# Patient Record
Sex: FEMALE | URBAN - METROPOLITAN AREA
[De-identification: names, ages, dates, MRNs, and addresses within clinical notes are randomized per-mention and may not be internally consistent; named-entity substitution may affect disease eponyms.]

---

## 2017-02-20 ENCOUNTER — TRANSFERRED RECORDS (OUTPATIENT)
Dept: HEALTH INFORMATION MANAGEMENT | Facility: CLINIC | Age: 22
End: 2017-02-20

## 2017-02-20 DIAGNOSIS — Z53.9 ERRONEOUS ENCOUNTER--DISREGARD: Primary | ICD-10-CM

## 2017-02-20 LAB
ALBUMIN SERPL-MCNC: 4.2 G/DL
ALP SERPL-CCNC: 83 U/L
ALT SERPL-CCNC: 9 U/L
AST SERPL-CCNC: 14 U/L
BILIRUB SERPL-MCNC: 0.2 MG/DL
BILIRUBIN DIRECT: 0.1 MG/DL
CHOL/HDLC RATIO - QUEST: 4.67
CHOLEST SERPL-MCNC: 154 MG/DL
GLUCOSE SERPL-MCNC: 96 MG/DL (ref 70–99)
HDLC SERPL-MCNC: 33 MG/DL
LDLC SERPL CALC-MCNC: 98 MG/DL
TOTAL PROTEIN - QUEST: 6.9
TRIGL SERPL-MCNC: 114 MG/DL

## 2017-02-22 ENCOUNTER — OFFICE VISIT (OUTPATIENT)
Dept: OTHER | Facility: OUTPATIENT CENTER | Age: 22
End: 2017-02-22

## 2017-02-22 DIAGNOSIS — F64.0 GENDER DYSPHORIA IN ADULT: Primary | ICD-10-CM

## 2017-02-22 NOTE — PROGRESS NOTES
[facility] -  Case Progress Note    Date of Service:2/22/17  Client Name: Soraida Dudley  Preferred name:  Harpreet male pronouns   YOB: 1995  MRN:  [id number]  Treating Provider: Galen Fermin Ph.D. Postdoctoral Fellow [facility]   Type of Session:  Individual   Present in Session: Client only  Number of Minutes:  45 minutes    Current Symptoms/Status:  The client meets the criteria for Gender Dysphoria (GD), which includes cross gender identification and substantial discomfort with biological sex (anatomical dysphoria) and the expectation of the birth assigned gender role for a period greater than 6 months.  There does not appear to be any evidence that the gender identity concerns are motivated by fetishistic or compulsive characteristics.  Progress Toward Treatment Goals:   The client continues to come to sessions.  Client has fallen behind on keeping appointments with Dr. Penn.      Intervention: Modality and Response:    The client (cl) arrived on time.  Cl  was oriented in person, place and time. Cl. denies suicidal or homicidal ideations. Cl. has good coping skills/social supports. Cl. denies difficulties with alcohol or substances. Client reported that since he came last time, he has been dealing with issues around dating and romantic relationships. Provider engaged the client in conversation about the impact of the transition process in relation to his relationship and his sexuality.  Client was open to the conversation, but expressed discomfort in regards to talking about sex.  Client reported that he does talk about sex with some of his closest cisgender friends and is able to talk to them about what his limitation sexually are.  Provider engaged the client on things and products to consider as a means to navigate his sexuality moving forward in a gender affirming manner. The client continues to report stability work environment.  The client reported that  although mother has continued to express wishing he transition back to female presentation, he has a stable and comfortable family environment.     Assignment:  - Call insurance company and inquire about transgender health coverage (specifically about his hormone prescription).   - Schedule with Dr. Penn for follow up session now that he has done his blood work.   Interactive Complexity:  1. None    Diagnosis:  302.85 (F64.1) Gender Dysphoria in Adult     Plan / Need for Future Services:  Return for therapy in 4 weeks.  Client will come to continue addressing gender goals as well as familial conflicts relating to the clients gender identity.     Galen Fermin, Ph.D.   Postdoctoral Fellow       I did not personally see the patient.  I reviewed and agree with the assessment and plan as documented in this note.   Darin Subramanian, PhD LP

## 2017-02-22 NOTE — MR AVS SNAPSHOT
After Visit Summary   2/22/2017    Soraida Dudley    MRN: [id number]           Patient Information     Date Of Birth          1995        Visit Information        Provider Department      2/22/2017 10:00 AM Galen Casey, PhD [facility]        Today's Diagnoses     Gender dysphoria in adult    -  1       Follow-ups after your visit        Your next 10 appointments already scheduled     Feb 27, 2017  8:30 AM CST   OFFICE VISIT with Estevan Penn MD   [facility] ([facility])    [address]  [address]  [facility] [address]   [phone]            Mar 22, 2017 10:00 AM CDT   INDIVIDUAL THERAPY with Galen Casey PhD   [facility] ([facility])    [address]  [address]  [facility] [address]   [phone]            Apr 26, 2017 10:00 AM CDT   INDIVIDUAL THERAPY with Galen Casey PhD   [facility] ([facility])    [address]  [address]  [facility] [address]   [phone]              Who to contact     Please call your clinic at [phone] to:    Ask questions about your health    Make or cancel appointments    Discuss your medicines    Learn about your test results    Speak to your doctor   If you have compliments or concerns about an experience at your clinic, or if you wish to file a complaint, please contact [facility] Patient Relations at [phone] or email us at [email or web address]         Additional Information About Your Visit        MyChart Information     MyCarGossip is an electronic gateway that provides easy, online access to your medical records. With MyCarGossip, you can request a clinic appointment, read your test results, renew a prescription or communicate with your care team.     To sign up for Copinyt visit the website at [email or web address]   You will be asked to enter  the access code listed below, as well as some personal information. Please follow the directions to create your username and password.     Your access code is: [email or web address]  Expires: 2017  4:02 PM     Your access code will  in 90 days. If you need help or a new code, please contact your [facility] or call [phone] for assistance.        Care EveryWhere ID     This is your Care EveryWhere ID. This could be used by other organizations to access your [place] medical records  [id number]         Blood Pressure from Last 3 Encounters:   16 119/72    Weight from Last 3 Encounters:   16 63 kg (139 lb) (70 %)*     * Growth percentiles are based on [facility] 2-20 Years data.              We Performed the Following     Individual Psychotherapy (38-52 min) [24870]        Primary Care Provider    None Specified       No primary provider on file.        Thank you!     Thank you for choosing [facility]  for your care. Our goal is always to provide you with excellent care. Hearing back from our patients is one way we can continue to improve our services. Please take a few minutes to complete the written survey that you may receive in the mail after your visit with us. Thank you!             Your Updated Medication List - Protect others around you: Learn how to safely use, store and throw away your medicines at www.disposemymeds.org.      Notice  As of 2017  4:02 PM    You have not been prescribed any medications.

## 2017-02-27 ENCOUNTER — OFFICE VISIT (OUTPATIENT)
Dept: OTHER | Facility: OUTPATIENT CENTER | Age: 22
End: 2017-02-27

## 2017-02-27 VITALS
DIASTOLIC BLOOD PRESSURE: 73 MMHG | HEIGHT: 60 IN | WEIGHT: 141.4 LBS | SYSTOLIC BLOOD PRESSURE: 109 MMHG | HEART RATE: 59 BPM | BODY MASS INDEX: 27.76 KG/M2

## 2017-02-27 DIAGNOSIS — F64.0 GENDER DYSPHORIA IN ADOLESCENT AND ADULT: Primary | ICD-10-CM

## 2017-02-27 RX ORDER — TESTOSTERONE CYPIONATE 200 MG/ML
50 INJECTION, SOLUTION INTRAMUSCULAR WEEKLY
Qty: 2 ML | Refills: 1 | Status: SHIPPED | OUTPATIENT
Start: 2017-02-27 | End: 2017-09-25

## 2017-02-27 ASSESSMENT — ENCOUNTER SYMPTOMS
POLYDIPSIA: 0
NERVOUS/ANXIOUS: 0
NUMBNESS: 0
HEADACHES: 0
LIGHT-HEADEDNESS: 0
CHILLS: 0
CHEST TIGHTNESS: 0
FREQUENCY: 0
SHORTNESS OF BREATH: 0
UNEXPECTED WEIGHT CHANGE: 0
ABDOMINAL PAIN: 0
VOMITING: 0
DYSPHORIC MOOD: 0
FEVER: 0
WEAKNESS: 0
PALPITATIONS: 0

## 2017-02-27 ASSESSMENT — PAIN SCALES - GENERAL: PAINLEVEL: NO PAIN (0)

## 2017-02-27 NOTE — MR AVS SNAPSHOT
After Visit Summary   2/27/2017    Soraida Dudley    MRN: [id number]           Patient Information     Date Of Birth          1995        Visit Information        Provider Department      2/27/2017 8:30 AM Estevan Penn MD [facility]        Today's Diagnoses     Gender dysphoria in adolescent and adult    -  1      Care Instructions    1. Do lab test for testosterone level 3-4 days after injection in 1 month  2. Can call for appointment with Dr. Penn or go to [facility] if having trouble with injections  3. See Dr. Penn in 2 months    For questions or concerns please call [phone] Monday through Friday   Or send a ALICE App message.    For medication refills contact your pharmacy. Ask that they fax the request to [phone].                    Follow-ups after your visit        Follow-up notes from your care team     Return in about 2 months (around 4/27/2017).      Your next 10 appointments already scheduled     Mar 22, 2017 10:00 AM CDT   INDIVIDUAL THERAPY with Galen Casey, PhD   [facility] ([facility])    [address] [facility] [address]  [address] 75[address]  [facility] [address]   [phone]            Apr 26, 2017 10:00 AM CDT   INDIVIDUAL THERAPY with Galen Casey, PhD   [facility] ([facility])    [address]  [address]  [facility] [address]   [phone]              Who to contact     Please call your clinic at [phone] to:    Ask questions about your health    Make or cancel appointments    Discuss your medicines    Learn about your test results    Speak to your doctor   If you have compliments or concerns about an experience at your clinic, or if you wish to file a complaint, please contact [facility] Patient Relations at [phone] or email us at [email or web address]         Additional Information About Your  "Visit        FlatStack Information     FlatStack is an electronic gateway that provides easy, online access to your medical records. With FlatStack, you can request a clinic appointment, read your test results, renew a prescription or communicate with your care team.     To sign up for FlatStack visit the website at [email or web address]   You will be asked to enter the access code listed below, as well as some personal information. Please follow the directions to create your username and password.     Your access code is: [email or web address]  Expires: 2017  4:02 PM     Your access code will  in 90 days. If you need help or a new code, please contact your [facility] or call [phone] for assistance.        Care EveryWhere ID     This is your Care EveryWhere ID. This could be used by other organizations to access your [place] medical records  [id number]        Your Vitals Were     Pulse Height Last Period BMI (Body Mass Index)          59 1.511 m (4' 11.5\") (LMP Unknown) 28.08 kg/m2         Blood Pressure from Last 3 Encounters:   17 109/73   16 119/72    Weight from Last 3 Encounters:   17 64.1 kg (141 lb 6.4 oz) (72 %)*   16 63 kg (139 lb) (70 %)*     * Growth percentiles are based on [facility] 2-20 Years data.              We Performed the Following     SCANNED MISC. LAB RESULTS          Today's Medication Changes          These changes are accurate as of: 17 11:59 PM.  If you have any questions, ask your nurse or doctor.               Start taking these medicines.        Dose/Directions    Syringe/Needle (Disp) 23G X 1\" 1 ML Misc   Used for:  Gender dysphoria in adolescent and adult   Started by:  Estevan Penn MD        To use with weekly IM injection   Quantity:  50 each   Refills:  1       testosterone cypionate 200 MG/ML injection   Commonly known as:  DEPOTESTOTERONE CYPIONATE   Used for:  Gender dysphoria in adolescent and adult   Started by: " " Estevan Penn MD        Dose:  50 mg   Inject 0.25 mLs (50 mg) into the muscle once a week   Quantity:  2 mL   Refills:  1            Where to get your medicines      These medications were sent to [facility] [address] [place] [address]  [address] [place] [address]     Phone:  [phone]     Syringe/Needle (Disp) 23G X 1\" 1 ML Misc         Some of these will need a paper prescription and others can be bought over the counter.  Ask your nurse if you have questions.     Bring a paper prescription for each of these medications     testosterone cypionate 200 MG/ML injection                Primary Care Provider    None Specified       No primary provider on file.        Thank you!     Thank you for choosing [facility]  for your care. Our goal is always to provide you with excellent care. Hearing back from our patients is one way we can continue to improve our services. Please take a few minutes to complete the written survey that you may receive in the mail after your visit with us. Thank you!             Your Updated Medication List - Protect others around you: Learn how to safely use, store and throw away your medicines at www.disposemymeds.org.          This list is accurate as of: 2/27/17 11:59 PM.  Always use your most recent med list.                   Brand Name Dispense Instructions for use    Syringe/Needle (Disp) 23G X 1\" 1 ML Misc     50 each    To use with weekly IM injection       testosterone cypionate 200 MG/ML injection    DEPOTESTOTERONE CYPIONATE    2 mL    Inject 0.25 mLs (50 mg) into the muscle once a week         "

## 2017-02-27 NOTE — LETTER
June 30, 2017      Soraida Dudley  [address]  [facility] [address]        Dear Soraida,    The results of your recent test(s) listed below show testosterone in the normal male range. Please schedule a follow up appointment, as you are several months overdue for a follow up visit.     Results for orders placed or performed in visit on 02/27/17   TESTOSTERONE, FREE & TOTAL   Result Value Ref Range    Testosterone Free 107.5 ng/dL    Testosterone Total 488 ng/dL         Please note that test explanations are brief and do not reflect all diagnostic uses.  If you have any questions or concerns, please call the clinic at [phone].      Sincerely,    Estevan Penn

## 2017-02-27 NOTE — PROGRESS NOTES
ILENE Mullins  is a 21 year old individual that uses pronouns He/Him/His/Himself that presents today for follow up of:  masculinizing hormone therapy.   Gender identity: male.   Started Hormone  therapy  today  Continues on N/A starts today  Any special concerns today?  no current concerns  On hormones?.Starts today  Gender related body changes since last visit: n/a    Activity: nothing so far; plans to start gym  New health concerns since last visit: none. LMP. 1/ 20//2017--nadeen--due  3 packs in last month--ready to quit    No past surgical history on file.    Patient Active Problem List   Diagnosis     Gender dysphoria in adult       No current outpatient prescriptions on file.       History   Smoking Status     Current Some Day Smoker   Smokeless Tobacco     Not on file        No Known Allergies    Health Maintenance Due   Topic Date Due     CHLAMYDIA SCREENING  1995     PEDS DTAP/TDAP (1 - Tdap) 10/22/2004     HPV IMMUNIZATION (1 of 3 - Female 3 Dose Series) 10/22/2008     TETANUS IMMUNIZATION (SYSTEM ASSIGNED)  10/22/2015     INFLUENZA VACCINE (SYSTEM ASSIGNED)  09/01/2016         Problem, Medication and Allergy Lists were reviewed and are current..         Review of Systems:   Review of Systems   Constitutional: Negative for chills, fever and unexpected weight change.   Eyes: Negative for visual disturbance.   Respiratory: Negative for chest tightness and shortness of breath.    Cardiovascular: Negative for chest pain, palpitations and leg swelling.   Gastrointestinal: Negative for abdominal pain and vomiting.   Endocrine: Negative for polydipsia and polyuria.   Genitourinary: Negative for frequency.   Neurological: Negative for weakness, light-headedness, numbness and headaches.   Psychiatric/Behavioral: Negative for dysphoric mood and suicidal ideas. The patient is not nervous/anxious.             Physical Exam:     Vitals:    02/27/17 0845   BP: 109/73   Pulse: 59   Weight: 64.1 kg (141 lb 6.4  "oz)   Height: 1.511 m (4' 11.5\")     BMI= Body mass index is 28.08 kg/(m^2).   Wt Readings from Last 10 Encounters:   02/27/17 64.1 kg (141 lb 6.4 oz) (72 %)*   12/19/16 63 kg (139 lb) (70 %)*     * Growth percentiles are based on [facility] 2-20 Years data.     Appearance: Male appearance and dress    GENERAL:: healthy, alert and no distress  Voice, skin per flowsheet    Affect: Appropriate/mood-congruent           Labs:   Results from last visit:  Orders Only on 02/20/2017   Component Date Value Ref Range Status     AST 02/20/2017 14  U/L Final     ALT 02/20/2017 9  U/L Final     Alkaline Phosphatase 02/20/2017 83  U/L Final     Glucose 02/20/2017 96  70 - 99 mg/dL Final     Total Protein 02/20/2017 6.9   Final     Albumin 02/20/2017 4.2  g/dL Final     Bilirubin Direct 02/20/2017 0.1  mg/dL Final     Bilirubin Total 02/20/2017 0.2  mg/dL Final     LDL Cholesterol Calculated 02/20/2017 98  mg/dL Final     HDL Cholesterol 02/20/2017 33  mg/dL Final     Cholesterol 02/20/2017 154  mg/dL Final     Triglycerides 02/20/2017 114  mg/dL Final     Chol/HDLC Ratio 02/20/2017 4.67   Final       Assessment and Plan   1. Gender dysphoria  Start Depo Testosterone 50 mg IM weekly, using 200 mg/mL strength, 1 mL syringe, 23 gauge 1\" needle. Pt. Instructed in how to draw up dose accurately and in IM injection technique.   Check testosterone level day 3-4 after injection in  1 Month  Can call for appointment with Dr. Penn or [facility] if having trouble with injections    Counselled patient about controlled substances: Yes.    Follow up:  Follow up in 2 months.  Results by mychart  Questions were elicited and answered.     Estevan Penn MD      "

## 2017-02-27 NOTE — PATIENT INSTRUCTIONS
1. Do lab test for testosterone level 3-4 days after injection in 1 month  2. Can call for appointment with Dr. Penn or go to [facility] if having trouble with injections  3. See Dr. Penn in 2 months    For questions or concerns please call [phone] Monday through Friday   Or send a UsingMiles Chart message.    For medication refills contact your pharmacy. Ask that they fax the request to [phone].

## 2017-02-27 NOTE — NURSING NOTE
"Chief Complaint   Patient presents with     RECHECK       Vitals:    02/27/17 0845   BP: 109/73   Pulse: 59   Weight: 64.1 kg (141 lb 6.4 oz)   Height: 1.511 m (4' 11.5\")       Body mass index is 28.08 kg/(m^2).      Chasidy Castaneda CMA                        "

## 2017-03-22 ENCOUNTER — OFFICE VISIT (OUTPATIENT)
Dept: OTHER | Facility: OUTPATIENT CENTER | Age: 22
End: 2017-03-22

## 2017-03-22 DIAGNOSIS — F64.0 GENDER DYSPHORIA IN ADULT: Primary | ICD-10-CM

## 2017-03-22 NOTE — PROGRESS NOTES
I did not personally see the patient.  I reviewed and agree with the assessment and plan as documented in this note.     Shani Patino PsyD, LP

## 2017-03-22 NOTE — MR AVS SNAPSHOT
After Visit Summary   3/22/2017    Soraida Dudley    MRN: [id number]           Patient Information     Date Of Birth          1995        Visit Information        Provider Department      3/22/2017 10:00 AM Galen Casey, PhD [facility]        Today's Diagnoses     Gender dysphoria in adult    -  1       Follow-ups after your visit        Your next 10 appointments already scheduled     Apr 26, 2017 10:00 AM CDT   INDIVIDUAL THERAPY with Galen Casey PhD   [facility] ([facility])    [address]  [address]  [facility] [address]   [phone]            May 24, 2017 11:00 AM CDT   INDIVIDUAL THERAPY with Galen Casey PhD   [facility] ([facility])    [address]  [address]  [facility] [address]   [phone]              Who to contact     Please call your clinic at [phone] to:    Ask questions about your health    Make or cancel appointments    Discuss your medicines    Learn about your test results    Speak to your doctor   If you have compliments or concerns about an experience at your clinic, or if you wish to file a complaint, please contact [facility] Patient Relations at [phone] or email us at [email or web address]         Additional Information About Your Visit        MyChart Information     Learnerator is an electronic gateway that provides easy, online access to your medical records. With Learnerator, you can request a clinic appointment, read your test results, renew a prescription or communicate with your care team.     To sign up for SigFigt visit the website at [email or web address]   You will be asked to enter the access code listed below, as well as some personal information. Please follow the directions to create your username and password.     Your access code is: [email or web address]  Expires: 5/23/2017  5:02 PM    "  Your access code will  in 90 days. If you need help or a new code, please contact your [facility] or call [phone] for assistance.        Care EveryWhere ID     This is your Care EveryWhere ID. This could be used by other organizations to access your [place] medical records  [id number]        Your Vitals Were     Last Period                   (LMP Unknown)            Blood Pressure from Last 3 Encounters:   17 109/73   16 119/72    Weight from Last 3 Encounters:   17 64.1 kg (141 lb 6.4 oz) (72 %)*   16 63 kg (139 lb) (70 %)*     * Growth percentiles are based on CDC 2-20 Years data.              We Performed the Following     Individual Psychotherapy (38-52 min) [71913]        Primary Care Provider    None Specified       No primary provider on file.        Thank you!     Thank you for choosing [facility]  for your care. Our goal is always to provide you with excellent care. Hearing back from our patients is one way we can continue to improve our services. Please take a few minutes to complete the written survey that you may receive in the mail after your visit with us. Thank you!             Your Updated Medication List - Protect others around you: Learn how to safely use, store and throw away your medicines at www.disposemymeds.org.          This list is accurate as of: 3/22/17  2:10 PM.  Always use your most recent med list.                   Brand Name Dispense Instructions for use    Syringe/Needle (Disp) 23G X 1\" 1 ML Misc     50 each    To use with weekly IM injection       testosterone cypionate 200 MG/ML injection    DEPOTESTOTERONE CYPIONATE    2 mL    Inject 0.25 mLs (50 mg) into the muscle once a week         "

## 2017-03-22 NOTE — PROGRESS NOTES
[facility] -  Case Progress Note    Date of Service:3/22/17  Client Name: Soraida Dudley  Preferred name:  Harpreet, male pronouns   YOB: 1995  MRN:  [id number]  Treating Provider: Galen Fermin Ph.D. Postdoctoral Fellow [facility]   Type of Session:  Individual   Present in Session: Client only  Number of Minutes:  45 minutes    Current Symptoms/Status:  The client meets the criteria for Gender Dysphoria (GD), which includes cross gender identification and substantial discomfort with biological sex (anatomical dysphoria) and the expectation of the birth assigned gender role for a period greater than 6 months.  There does not appear to be any evidence that the gender identity concerns are motivated by fetishistic or compulsive characteristics.  Progress Toward Treatment Goals:   Client continues to make progress in his gender related goals.     Intervention: Modality and Response:    The client (cl) arrived on time.  Cl  was oriented in person, place and time. Cl. denies suicidal or homicidal ideations. Cl. has good coping skills/social supports. Cl. denies difficulties with alcohol or substances. Provider used person centered approach as well as communication skills training to address gender related concerns.     Client reported that he has been on testosterone for 3 weeks.  Client is now contemplating leaving his parents home. Client expressed concerns over his parents ongoing issue around his gender identity and sexual orientation.  Parents have continued to express disappointment in relation to his identity.  There are cultural factors that are intervening in his parents ability to show and experience flexibility in their approach towards the client.  Provider engaged the client in processing the importance of authentic communication and possible future implications of limiting communication with his parents.  Client agreed that he will continue to come to therapy once  every 4 weeks to continue monitoring and processing physiological and psychosocial changes in regards to hormone therapy.     Assignment:  - Continue taking hormones as prescribed.   - Consider letting parents know where he is in the medical transition process.     Interactive Complexity:  1. None    Diagnosis:  302.85 (F64.1) Gender Dysphoria in Adult     Plan / Need for Future Services:  Return for therapy in 4 weeks.  Client will come to continue addressing gender goals as well as familial conflicts relating to the clients gender identity.     Galen Fermin, Ph.D.   Postdoctoral Fellow

## 2017-06-16 ENCOUNTER — TRANSFERRED RECORDS (OUTPATIENT)
Dept: HEALTH INFORMATION MANAGEMENT | Facility: CLINIC | Age: 22
End: 2017-06-16

## 2017-06-16 LAB
TESTOST SERPL-MCNC: 488 NG/DL
TESTOSTERONE, FREE - QUEST: 107.5 NG/DL

## 2017-06-26 ENCOUNTER — DOCUMENTATION ONLY (OUTPATIENT)
Dept: OTHER | Facility: OUTPATIENT CENTER | Age: 22
End: 2017-06-26

## 2017-07-28 DIAGNOSIS — F64.0 GENDER DYSPHORIA IN ADOLESCENT AND ADULT: ICD-10-CM

## 2017-07-28 RX ORDER — TESTOSTERONE CYPIONATE 200 MG/ML
50 INJECTION, SOLUTION INTRAMUSCULAR WEEKLY
Qty: 2 ML | Refills: 1 | OUTPATIENT
Start: 2017-07-28

## 2017-08-01 ENCOUNTER — TELEPHONE (OUTPATIENT)
Dept: OTHER | Facility: OUTPATIENT CENTER | Age: 22
End: 2017-08-01

## 2017-09-25 ENCOUNTER — OFFICE VISIT (OUTPATIENT)
Dept: OTHER | Facility: OUTPATIENT CENTER | Age: 22
End: 2017-09-25

## 2017-09-25 VITALS
WEIGHT: 146 LBS | BODY MASS INDEX: 28.66 KG/M2 | HEIGHT: 60 IN | HEART RATE: 78 BPM | DIASTOLIC BLOOD PRESSURE: 67 MMHG | SYSTOLIC BLOOD PRESSURE: 105 MMHG

## 2017-09-25 DIAGNOSIS — F64.0 GENDER DYSPHORIA IN ADOLESCENT AND ADULT: ICD-10-CM

## 2017-09-25 RX ORDER — TESTOSTERONE CYPIONATE 200 MG/ML
50 INJECTION, SOLUTION INTRAMUSCULAR WEEKLY
Qty: 2 ML | Refills: 1 | Status: SHIPPED | OUTPATIENT
Start: 2017-09-25

## 2017-09-25 ASSESSMENT — ENCOUNTER SYMPTOMS
NERVOUS/ANXIOUS: 0
WEAKNESS: 0
CHILLS: 0
NUMBNESS: 0
ABDOMINAL PAIN: 0
LIGHT-HEADEDNESS: 0
FEVER: 0
DYSPHORIC MOOD: 0
SHORTNESS OF BREATH: 0
CHEST TIGHTNESS: 0
POLYDIPSIA: 0
VOMITING: 0
FREQUENCY: 0
HEADACHES: 0
UNEXPECTED WEIGHT CHANGE: 0
PALPITATIONS: 0

## 2017-09-25 NOTE — PROGRESS NOTES
"       ILENE Mullins   is a 21 year old individual that uses pronouns He/Him/His/Himself that presents today for follow up of:  masculinizing hormone therapy.   Gender identity: masculine.   Started Hormone  therapy  2/2107  Continues on Depo Testosterone 50 mg IM weekly  .  Any special concerns today?  Has not been seen since started testosterone; has had financial issues. Ran out of testosterone June 2017.   Unsure now of what syringe was using, but believes it was 1mL, can't remember where had been drawing up dose at on syringe.   During that time, no problems with medication or injections. Menses were regular during this time.    LMP 8/25/2017    On hormones?  See above  Gender related body changes since last visit: While on hormones, skin was thicker, hair on legs, voice got deeper. A little more acne.     Activity: not much  New health concerns since last visit: none; smoking 1 cig/month  ---    No past surgical history on file.    Patient Active Problem List   Diagnosis     Gender dysphoria in adult     Current Outpatient Prescriptions   Medication Sig Dispense Refill     testosterone cypionate (DEPOTESTOTERONE CYPIONATE) 200 MG/ML injection Inject 0.25 mLs (50 mg) into the muscle once a week 2 mL 1     Syringe/Needle, Disp, 23G X 1\" 1 ML MISC To use with weekly IM injection 50 each 1       History   Smoking Status     Current Some Day Smoker   Smokeless Tobacco     Not on file        No Known Allergies    Health Maintenance Due   Topic Date Due     CHLAMYDIA SCREENING  1997     PEDS DTAP/TDAP (1 - Tdap) 10/22/2004     HPV IMMUNIZATION (1 of 3 - Female 3 Dose Series) 10/22/2008     TETANUS IMMUNIZATION (SYSTEM ASSIGNED)  10/22/2015     INFLUENZA VACCINE (SYSTEM ASSIGNED)  09/01/2017         Problem, Medication and Allergy Lists were reviewed and are current..         Review of Systems:   Review of Systems   Constitutional: Negative for chills, fever and unexpected weight change.   Eyes: Negative for " "visual disturbance.   Respiratory: Negative for chest tightness and shortness of breath.    Cardiovascular: Negative for chest pain, palpitations and leg swelling.   Gastrointestinal: Negative for abdominal pain and vomiting.   Endocrine: Negative for polydipsia and polyuria.   Genitourinary: Negative for frequency.   Neurological: Negative for weakness, light-headedness, numbness and headaches.   Psychiatric/Behavioral: Negative for dysphoric mood and suicidal ideas. The patient is not nervous/anxious.             Physical Exam:     Vitals:    09/25/17 1327   BP: 105/67   Pulse: 78   Weight: 66.2 kg (146 lb)   Height: 1.511 m (4' 11.5\")     BMI= Body mass index is 28.99 kg/(m^2).   Wt Readings from Last 10 Encounters:   09/25/17 66.2 kg (146 lb) (76 %)*   02/27/17 64.1 kg (141 lb 6.4 oz) (72 %)*   12/19/16 63 kg (139 lb) (70 %)*     * Growth percentiles are based on CDC 2-20 Years data.     Appearance: Male appearance and dress    GENERAL:: healthy, alert and no distress  RESP: lungs clear to auscultation - no rales, no rhonchi, no wheezes  CV: regular rates and rhythm, normal S1 S2, no S3 or S4 and no murmur, no click or rub -  Voice, skin per flowsheet    Affect: Appropriate/mood-congruent           Labs:   Results from last visit:  Office Visit on 02/27/2017   Component Date Value Ref Range Status     Testosterone Free 06/16/2017 107.5  ng/dL Final     Testosterone Total 06/16/2017 488  ng/dL Final       Assessment and Plan   1. Gender dysphoria  Counseled patient that will essentially be starting over since unable to tell how he was doing injections from the beginning.   Reviewed expectations for labs and follow up visits, and how to do testosterone injections and dosing:  To draw up testosterone using 1 mL syringe, 1/2 way between 0.2 and 0.3 herminio  Do lab test (testosterone) in 1 month day 3-4 after injection    Sexually active with partner  Contraception:   not needed    .Follow up:  Follow up in 2 " months.  [facility]  Questions were elicited and answered.     Estevan Penn MD

## 2017-09-25 NOTE — PATIENT INSTRUCTIONS
To draw up testosterone using 1 mL syringe, 1/2 way between 0.2 and 0.3 herminio  Do lab test in 1 month day 3-4 after injection  See Dr. Penn in 2-3 months.

## 2017-09-25 NOTE — NURSING NOTE
"Chief Complaint   Patient presents with     RECHECK     TG       Vitals:    09/25/17 1327   BP: 105/67   Pulse: 78   Weight: 66.2 kg (146 lb)   Height: 1.511 m (4' 11.5\")       Body mass index is 28.99 kg/(m^2).      Chasidy Castaneda [place], [place]    "

## 2018-09-05 ENCOUNTER — MEDICAL CORRESPONDENCE (OUTPATIENT)
Dept: HEALTH INFORMATION MANAGEMENT | Facility: CLINIC | Age: 23
End: 2018-09-05

## 2018-10-04 ENCOUNTER — OFFICE VISIT (OUTPATIENT)
Dept: OTHER | Facility: OUTPATIENT CENTER | Age: 23
End: 2018-10-04

## 2018-10-04 VITALS
HEIGHT: 60 IN | HEART RATE: 70 BPM | BODY MASS INDEX: 26.7 KG/M2 | WEIGHT: 136 LBS | DIASTOLIC BLOOD PRESSURE: 77 MMHG | SYSTOLIC BLOOD PRESSURE: 118 MMHG

## 2018-10-04 DIAGNOSIS — F64.0 GENDER DYSPHORIA IN ADOLESCENT AND ADULT: Primary | ICD-10-CM

## 2018-10-04 DIAGNOSIS — Z71.1 CONCERN ABOUT FEMALE GENITAL DISEASE WITHOUT DIAGNOSIS: ICD-10-CM

## 2018-10-04 NOTE — NURSING NOTE
Chief Complaint   Patient presents with     RECHECK     TG       Vitals:    10/04/18 1630   BP: 118/77   Pulse: 70   Weight: 61.7 kg (136 lb)   Height: 1.524 m (5')       Body mass index is 26.56 kg/(m^2).      Chasidy Castaneda CMA    Has not been on testosterone for 1 yr due to insurance    No primary provider
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
## 2018-10-04 NOTE — MR AVS SNAPSHOT
After Visit Summary   10/4/2018    Soraida Dudley    MRN: [id number]           Patient Information     Date Of Birth          1995        Visit Information        Provider Department      10/4/2018 4:20 PM Estevan Penn MD [facility]        Today's Diagnoses     Gender dysphoria in adolescent and adult    -  1    Concern about female genital disease without diagnosis           Follow-ups after your visit        Follow-up notes from your care team     Return if symptoms worsen or fail to improve.      Who to contact     Please call your clinic at [phone] to:    Ask questions about your health    Make or cancel appointments    Discuss your medicines    Learn about your test results    Speak to your doctor            Additional Information About Your Visit        MyChart Information     [employer] is an electronic gateway that provides easy, online access to your medical records. With [employer], you can request a clinic appointment, read your test results, renew a prescription or communicate with your care team.     To sign up for [employer] visit the website at [email or web address]   You will be asked to enter the access code listed below, as well as some personal information. Please follow the directions to create your username and password.     Your access code is: [email or web address]  Expires: 2019  5:22 PM     Your access code will  in 90 days. If you need help or a new code, please contact your [facility] or call [phone] for assistance.        Care EveryWhere ID     This is your Care EveryWhere ID. This could be used by other organizations to access your [place] medical records  [id number]        Your Vitals Were     Pulse Height Last Period BMI (Body Mass Index)          70 1.524 m (5') 2018 (Approximate) 26.56 kg/m2         Blood Pressure from Last 3 Encounters:   10/04/18 118/77   17 105/67   17 109/73     "Weight from Last 3 Encounters:   10/04/18 61.7 kg (136 lb)   09/25/17 66.2 kg (146 lb) (76 %)*   02/27/17 64.1 kg (141 lb 6.4 oz) (72 %)*     * Growth percentiles are based on [facility] 2-20 Years data.              Today, you had the following     No orders found for display       Primary Care Provider    None Specified       No primary provider on file.        Equal Access to Services     VIVIENNE CADET : Hadii drea prabhakar Soabundio, wamaiada lukrisadaha, qafredata kaalmada eladioda, marcy sandsjusticeeileen frye . So [facility] [phone].    ATENCIÓN: Si darlinela ravinder, tiene a carter disposición servicios gratuitos de asistencia lingüística. Llame al [phone].    We comply with applicable federal civil rights laws and [place] laws. We do not discriminate on the basis of race, color, national origin, age, disability, sex, sexual orientation, or gender identity.            Thank you!     Thank you for choosing [facility]  for your care. Our goal is always to provide you with excellent care. Hearing back from our patients is one way we can continue to improve our services. Please take a few minutes to complete the written survey that you may receive in the mail after your visit with us. Thank you!             Your Updated Medication List - Protect others around you: Learn how to safely use, store and throw away your medicines at www.disposemymeds.org.          This list is accurate as of 10/4/18 11:59 PM.  Always use your most recent med list.                   Brand Name Dispense Instructions for use Diagnosis    Syringe/Needle (Disp) 23G X 1\" 1 ML Misc     50 each    To use with weekly IM injection    Gender dysphoria in adolescent and adult       testosterone cypionate 200 MG/ML injection    DEPOTESTOSTERONE    2 mL    Inject 0.25 mLs (50 mg) into the muscle once a week    Gender dysphoria in adolescent and adult         "